# Patient Record
Sex: MALE | Race: WHITE | Employment: FULL TIME | URBAN - METROPOLITAN AREA
[De-identification: names, ages, dates, MRNs, and addresses within clinical notes are randomized per-mention and may not be internally consistent; named-entity substitution may affect disease eponyms.]

---

## 2018-10-16 ENCOUNTER — APPOINTMENT (OUTPATIENT)
Dept: GENERAL RADIOLOGY | Age: 38
End: 2018-10-16
Payer: COMMERCIAL

## 2018-10-16 ENCOUNTER — APPOINTMENT (OUTPATIENT)
Dept: CT IMAGING | Age: 38
End: 2018-10-16
Payer: COMMERCIAL

## 2018-10-16 ENCOUNTER — HOSPITAL ENCOUNTER (EMERGENCY)
Age: 38
Discharge: OTHER INSTITUTION | End: 2018-10-17
Attending: EMERGENCY MEDICINE
Payer: COMMERCIAL

## 2018-10-16 VITALS
SYSTOLIC BLOOD PRESSURE: 127 MMHG | BODY MASS INDEX: 39.76 KG/M2 | HEART RATE: 111 BPM | HEIGHT: 73 IN | WEIGHT: 300 LBS | DIASTOLIC BLOOD PRESSURE: 77 MMHG | RESPIRATION RATE: 30 BRPM | OXYGEN SATURATION: 93 % | TEMPERATURE: 98.3 F

## 2018-10-16 DIAGNOSIS — R91.8 MASS OF LEFT LUNG: ICD-10-CM

## 2018-10-16 DIAGNOSIS — I26.09 OTHER ACUTE PULMONARY EMBOLISM WITH ACUTE COR PULMONALE (HCC): Primary | ICD-10-CM

## 2018-10-16 DIAGNOSIS — R07.1 CHEST PAIN ON BREATHING: ICD-10-CM

## 2018-10-16 LAB
A/G RATIO: 1.2 (ref 1.1–2.2)
ALBUMIN SERPL-MCNC: 4.5 G/DL (ref 3.4–5)
ALP BLD-CCNC: 64 U/L (ref 40–129)
ALT SERPL-CCNC: 23 U/L (ref 10–40)
AMORPHOUS: ABNORMAL /HPF
ANION GAP SERPL CALCULATED.3IONS-SCNC: 16 MMOL/L (ref 3–16)
APTT: 33 SEC (ref 26–36)
AST SERPL-CCNC: 20 U/L (ref 15–37)
BASOPHILS ABSOLUTE: 0.1 K/UL (ref 0–0.2)
BASOPHILS RELATIVE PERCENT: 0.3 %
BILIRUB SERPL-MCNC: 3.5 MG/DL (ref 0–1)
BILIRUBIN URINE: ABNORMAL
BLOOD, URINE: ABNORMAL
BUN BLDV-MCNC: 11 MG/DL (ref 7–20)
CALCIUM SERPL-MCNC: 9.4 MG/DL (ref 8.3–10.6)
CASTS 2: ABNORMAL /LPF
CHLORIDE BLD-SCNC: 98 MMOL/L (ref 99–110)
CLARITY: CLEAR
CO2: 24 MMOL/L (ref 21–32)
COLOR: ABNORMAL
CREAT SERPL-MCNC: 0.9 MG/DL (ref 0.9–1.3)
D DIMER: 665 NG/ML DDU (ref 0–229)
EKG ATRIAL RATE: 130 BPM
EKG DIAGNOSIS: NORMAL
EKG P AXIS: 82 DEGREES
EKG P-R INTERVAL: 150 MS
EKG Q-T INTERVAL: 292 MS
EKG QRS DURATION: 80 MS
EKG QTC CALCULATION (BAZETT): 429 MS
EKG R AXIS: 17 DEGREES
EKG T AXIS: 110 DEGREES
EKG VENTRICULAR RATE: 130 BPM
EOSINOPHILS ABSOLUTE: 0 K/UL (ref 0–0.6)
EOSINOPHILS RELATIVE PERCENT: 0 %
GFR AFRICAN AMERICAN: >60
GFR NON-AFRICAN AMERICAN: >60
GLOBULIN: 3.7 G/DL
GLUCOSE BLD-MCNC: 142 MG/DL (ref 70–99)
GLUCOSE URINE: NEGATIVE MG/DL
HCT VFR BLD CALC: 45.1 % (ref 40.5–52.5)
HEMOGLOBIN: 14.4 G/DL (ref 13.5–17.5)
KETONES, URINE: 15 MG/DL
LACTIC ACID, SEPSIS: 1.4 MMOL/L (ref 0.4–1.9)
LACTIC ACID, SEPSIS: 2.5 MMOL/L (ref 0.4–1.9)
LEUKOCYTE ESTERASE, URINE: NEGATIVE
LYMPHOCYTES ABSOLUTE: 2.8 K/UL (ref 1–5.1)
LYMPHOCYTES RELATIVE PERCENT: 14.9 %
MCH RBC QN AUTO: 24.1 PG (ref 26–34)
MCHC RBC AUTO-ENTMCNC: 31.9 G/DL (ref 31–36)
MCV RBC AUTO: 75.6 FL (ref 80–100)
MICROSCOPIC EXAMINATION: YES
MONOCYTES ABSOLUTE: 1.3 K/UL (ref 0–1.3)
MONOCYTES RELATIVE PERCENT: 7.1 %
MUCUS: ABNORMAL /LPF
NEUTROPHILS ABSOLUTE: 14.5 K/UL (ref 1.7–7.7)
NEUTROPHILS RELATIVE PERCENT: 77.7 %
NITRITE, URINE: NEGATIVE
PDW BLD-RTO: 16.6 % (ref 12.4–15.4)
PH UA: 5.5
PLATELET # BLD: 368 K/UL (ref 135–450)
PMV BLD AUTO: 8 FL (ref 5–10.5)
POTASSIUM SERPL-SCNC: 4.2 MMOL/L (ref 3.5–5.1)
PRO-BNP: 6244 PG/ML (ref 0–124)
PROTEIN UA: >=300 MG/DL
RBC # BLD: 5.97 M/UL (ref 4.2–5.9)
RBC UA: ABNORMAL /HPF (ref 0–2)
SODIUM BLD-SCNC: 138 MMOL/L (ref 136–145)
SPECIFIC GRAVITY UA: >=1.03
TOTAL PROTEIN: 8.2 G/DL (ref 6.4–8.2)
TROPONIN: 0.02 NG/ML
TROPONIN: 0.02 NG/ML
URINE REFLEX TO CULTURE: ABNORMAL
URINE TYPE: ABNORMAL
UROBILINOGEN, URINE: 0.2 E.U./DL
WBC # BLD: 18.7 K/UL (ref 4–11)
WBC UA: ABNORMAL /HPF (ref 0–5)

## 2018-10-16 PROCEDURE — 96376 TX/PRO/DX INJ SAME DRUG ADON: CPT

## 2018-10-16 PROCEDURE — 96365 THER/PROPH/DIAG IV INF INIT: CPT

## 2018-10-16 PROCEDURE — 6370000000 HC RX 637 (ALT 250 FOR IP): Performed by: EMERGENCY MEDICINE

## 2018-10-16 PROCEDURE — 93010 ELECTROCARDIOGRAM REPORT: CPT | Performed by: INTERNAL MEDICINE

## 2018-10-16 PROCEDURE — 80053 COMPREHEN METABOLIC PANEL: CPT

## 2018-10-16 PROCEDURE — 93005 ELECTROCARDIOGRAM TRACING: CPT | Performed by: EMERGENCY MEDICINE

## 2018-10-16 PROCEDURE — 2580000003 HC RX 258: Performed by: EMERGENCY MEDICINE

## 2018-10-16 PROCEDURE — 99291 CRITICAL CARE FIRST HOUR: CPT

## 2018-10-16 PROCEDURE — 96375 TX/PRO/DX INJ NEW DRUG ADDON: CPT

## 2018-10-16 PROCEDURE — 81001 URINALYSIS AUTO W/SCOPE: CPT

## 2018-10-16 PROCEDURE — 85025 COMPLETE CBC W/AUTO DIFF WBC: CPT

## 2018-10-16 PROCEDURE — 83605 ASSAY OF LACTIC ACID: CPT

## 2018-10-16 PROCEDURE — 83880 ASSAY OF NATRIURETIC PEPTIDE: CPT

## 2018-10-16 PROCEDURE — 71260 CT THORAX DX C+: CPT

## 2018-10-16 PROCEDURE — 84484 ASSAY OF TROPONIN QUANT: CPT

## 2018-10-16 PROCEDURE — 71046 X-RAY EXAM CHEST 2 VIEWS: CPT

## 2018-10-16 PROCEDURE — 96361 HYDRATE IV INFUSION ADD-ON: CPT

## 2018-10-16 PROCEDURE — 85379 FIBRIN DEGRADATION QUANT: CPT

## 2018-10-16 PROCEDURE — 6360000004 HC RX CONTRAST MEDICATION: Performed by: EMERGENCY MEDICINE

## 2018-10-16 PROCEDURE — 6360000002 HC RX W HCPCS: Performed by: EMERGENCY MEDICINE

## 2018-10-16 PROCEDURE — 87040 BLOOD CULTURE FOR BACTERIA: CPT

## 2018-10-16 PROCEDURE — 85730 THROMBOPLASTIN TIME PARTIAL: CPT

## 2018-10-16 RX ORDER — FUROSEMIDE 10 MG/ML
80 INJECTION INTRAMUSCULAR; INTRAVENOUS ONCE
Status: COMPLETED | OUTPATIENT
Start: 2018-10-16 | End: 2018-10-16

## 2018-10-16 RX ORDER — IPRATROPIUM BROMIDE AND ALBUTEROL SULFATE 2.5; .5 MG/3ML; MG/3ML
1 SOLUTION RESPIRATORY (INHALATION) ONCE
Status: COMPLETED | OUTPATIENT
Start: 2018-10-16 | End: 2018-10-16

## 2018-10-16 RX ORDER — HEPARIN SODIUM 1000 [USP'U]/ML
80 INJECTION, SOLUTION INTRAVENOUS; SUBCUTANEOUS PRN
Status: DISCONTINUED | OUTPATIENT
Start: 2018-10-16 | End: 2018-10-17 | Stop reason: HOSPADM

## 2018-10-16 RX ORDER — HYDROMORPHONE HCL 110MG/55ML
1 PATIENT CONTROLLED ANALGESIA SYRINGE INTRAVENOUS ONCE
Status: COMPLETED | OUTPATIENT
Start: 2018-10-16 | End: 2018-10-16

## 2018-10-16 RX ORDER — HEPARIN SODIUM 1000 [USP'U]/ML
80 INJECTION, SOLUTION INTRAVENOUS; SUBCUTANEOUS ONCE
Status: COMPLETED | OUTPATIENT
Start: 2018-10-16 | End: 2018-10-16

## 2018-10-16 RX ORDER — NITROGLYCERIN 0.4 MG/1
0.4 TABLET SUBLINGUAL EVERY 5 MIN PRN
Status: DISCONTINUED | OUTPATIENT
Start: 2018-10-16 | End: 2018-10-17 | Stop reason: HOSPADM

## 2018-10-16 RX ORDER — SODIUM CHLORIDE 0.9 % (FLUSH) 0.9 %
10 SYRINGE (ML) INJECTION EVERY 12 HOURS SCHEDULED
Status: DISCONTINUED | OUTPATIENT
Start: 2018-10-16 | End: 2018-10-17 | Stop reason: HOSPADM

## 2018-10-16 RX ORDER — MORPHINE SULFATE 4 MG/ML
2 INJECTION, SOLUTION INTRAMUSCULAR; INTRAVENOUS ONCE
Status: COMPLETED | OUTPATIENT
Start: 2018-10-16 | End: 2018-10-16

## 2018-10-16 RX ORDER — HYDROMORPHONE HCL 110MG/55ML
0.5 PATIENT CONTROLLED ANALGESIA SYRINGE INTRAVENOUS ONCE
Status: COMPLETED | OUTPATIENT
Start: 2018-10-16 | End: 2018-10-16

## 2018-10-16 RX ORDER — SODIUM CHLORIDE 0.9 % (FLUSH) 0.9 %
10 SYRINGE (ML) INJECTION PRN
Status: DISCONTINUED | OUTPATIENT
Start: 2018-10-16 | End: 2018-10-17 | Stop reason: HOSPADM

## 2018-10-16 RX ORDER — ASPIRIN 81 MG/1
324 TABLET, CHEWABLE ORAL ONCE
Status: COMPLETED | OUTPATIENT
Start: 2018-10-16 | End: 2018-10-16

## 2018-10-16 RX ORDER — DIAZEPAM 5 MG/1
5 TABLET ORAL ONCE
Status: COMPLETED | OUTPATIENT
Start: 2018-10-16 | End: 2018-10-16

## 2018-10-16 RX ORDER — HEPARIN SODIUM 1000 [USP'U]/ML
40 INJECTION, SOLUTION INTRAVENOUS; SUBCUTANEOUS PRN
Status: DISCONTINUED | OUTPATIENT
Start: 2018-10-16 | End: 2018-10-17 | Stop reason: HOSPADM

## 2018-10-16 RX ORDER — 0.9 % SODIUM CHLORIDE 0.9 %
30 INTRAVENOUS SOLUTION INTRAVENOUS ONCE
Status: COMPLETED | OUTPATIENT
Start: 2018-10-16 | End: 2018-10-16

## 2018-10-16 RX ORDER — IBUPROFEN 400 MG/1
400 TABLET ORAL EVERY 6 HOURS PRN
COMMUNITY

## 2018-10-16 RX ADMIN — ASPIRIN 324 MG: 81 TABLET, CHEWABLE ORAL at 18:21

## 2018-10-16 RX ADMIN — NITROGLYCERIN 0.4 MG: 0.4 TABLET SUBLINGUAL at 19:04

## 2018-10-16 RX ADMIN — HYDROMORPHONE HYDROCHLORIDE 1 MG: 2 INJECTION INTRAMUSCULAR; INTRAVENOUS; SUBCUTANEOUS at 20:10

## 2018-10-16 RX ADMIN — HYDROMORPHONE HYDROCHLORIDE 0.5 MG: 2 INJECTION INTRAMUSCULAR; INTRAVENOUS; SUBCUTANEOUS at 19:10

## 2018-10-16 RX ADMIN — CEFEPIME HYDROCHLORIDE 2 G: 2 INJECTION, POWDER, FOR SOLUTION INTRAVENOUS at 18:23

## 2018-10-16 RX ADMIN — FUROSEMIDE 80 MG: 10 INJECTION, SOLUTION INTRAMUSCULAR; INTRAVENOUS at 19:47

## 2018-10-16 RX ADMIN — DIAZEPAM 5 MG: 5 TABLET ORAL at 20:10

## 2018-10-16 RX ADMIN — HEPARIN SODIUM 8190 UNITS: 1000 INJECTION, SOLUTION INTRAVENOUS; SUBCUTANEOUS at 22:38

## 2018-10-16 RX ADMIN — SODIUM CHLORIDE 4083 ML: 9 INJECTION, SOLUTION INTRAVENOUS at 18:27

## 2018-10-16 RX ADMIN — HEPARIN SODIUM 18.4 ML/HR: 10000 INJECTION, SOLUTION INTRAVENOUS at 22:37

## 2018-10-16 RX ADMIN — HYDROMORPHONE HYDROCHLORIDE 1 MG: 2 INJECTION INTRAMUSCULAR; INTRAVENOUS; SUBCUTANEOUS at 23:08

## 2018-10-16 RX ADMIN — IOPAMIDOL 75 ML: 755 INJECTION, SOLUTION INTRAVENOUS at 20:48

## 2018-10-16 RX ADMIN — IPRATROPIUM BROMIDE AND ALBUTEROL SULFATE 1 AMPULE: .5; 3 SOLUTION RESPIRATORY (INHALATION) at 18:23

## 2018-10-16 RX ADMIN — MORPHINE SULFATE 2 MG: 4 INJECTION INTRAVENOUS at 18:20

## 2018-10-16 ASSESSMENT — PAIN SCALES - GENERAL
PAINLEVEL_OUTOF10: 10
PAINLEVEL_OUTOF10: 9
PAINLEVEL_OUTOF10: 10
PAINLEVEL_OUTOF10: 10
PAINLEVEL_OUTOF10: 7
PAINLEVEL_OUTOF10: 10

## 2018-10-16 ASSESSMENT — PAIN DESCRIPTION - LOCATION
LOCATION: BACK
LOCATION: CHEST;BACK;LEG

## 2018-10-16 ASSESSMENT — PAIN DESCRIPTION - DESCRIPTORS
DESCRIPTORS: SHARP;SHOOTING;SPASM
DESCRIPTORS: SHOOTING;SHARP;STABBING

## 2018-10-16 NOTE — ED PROVIDER NOTES
10/16/18 2010)   heparin (porcine) injection 8,190 Units (8,190 Units Intravenous Given 10/16/18 2238)   HYDROmorphone (DILAUDID) injection 1 mg (1 mg Intravenous Given 10/16/18 2308)       Patient transferred to [facility] for right PE with pulmonary infarct and signs of right heart strain. Presented with right back pain and shortness of breath. On exam patient was to give neck, tachycardic and diaphoretic. Initial EKG showed T-wave inversions without ST elevations. Repeat EKG inversions have resolved. Given aspirin and nitroglycerin doesn't concern for ACS. Troponin 0.02. No significant increase in second troponin. BNP elevated. No history of pulmonary edema. Patient has had leg swelling and concern for cellulitis. Cellulitis likely DVT. No prior history of congestive heart failure, ACS, PE or smoking. CT showed PE. Started on high dose heparin. Patient given IV fluids because of tachycardia and started on broad-spectrum antibiotics initially for possible sepsis. Sepsis and pneumonia unlikely after CT results. Decreased heart rate and resolution of tachypnea. Patient appears comfortable. No hypoxia. Accepted for transfer by hospitalist at [facility]. Plan was to transfer to [facility] for ICU and cardiology but patient's family requested [facility] since they are affiliated with the hospital    Course:  1. Decreased pain. Looks comfortable. No tachypnea. Diaphoresis. Decreased heart rate. No hypoxia    Discussed all test results, treatment, clinical impression with patient. Discussed plan of care. Agreed with plan. CRITICAL CARE TIME     Critical Care: 35 minutes of critical care time were used in the treatment of this patient not including teaching or procedures. Patient was emergently evaluated for potential life-threatening conditions to prevent further decompensation, injury, or death.  Critical care time in included history, physical exam, interpretation of

## 2018-10-17 NOTE — ED NOTES
[address] [facility] called with placement info. [facility] ICU bed #1317. Report # [phone].   [facility] ETA [address] [place] [address]  10/16/18 5036

## 2018-10-18 LAB
EKG ATRIAL RATE: 122 BPM
EKG ATRIAL RATE: 131 BPM
EKG DIAGNOSIS: NORMAL
EKG DIAGNOSIS: NORMAL
EKG P AXIS: 63 DEGREES
EKG P AXIS: 72 DEGREES
EKG P-R INTERVAL: 144 MS
EKG P-R INTERVAL: 146 MS
EKG Q-T INTERVAL: 290 MS
EKG Q-T INTERVAL: 338 MS
EKG QRS DURATION: 84 MS
EKG QRS DURATION: 88 MS
EKG QTC CALCULATION (BAZETT): 428 MS
EKG QTC CALCULATION (BAZETT): 481 MS
EKG R AXIS: -2 DEGREES
EKG R AXIS: 15 DEGREES
EKG T AXIS: 118 DEGREES
EKG T AXIS: 124 DEGREES
EKG VENTRICULAR RATE: 122 BPM
EKG VENTRICULAR RATE: 131 BPM

## 2018-10-18 PROCEDURE — 93010 ELECTROCARDIOGRAM REPORT: CPT | Performed by: INTERNAL MEDICINE

## 2018-10-21 LAB
BLOOD CULTURE, ROUTINE: NORMAL
CULTURE, BLOOD 2: NORMAL